# Patient Record
Sex: FEMALE | Race: WHITE | NOT HISPANIC OR LATINO | Employment: STUDENT | RURAL
[De-identification: names, ages, dates, MRNs, and addresses within clinical notes are randomized per-mention and may not be internally consistent; named-entity substitution may affect disease eponyms.]

---
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
## 2025-02-17 ENCOUNTER — TELEMEDICINE (OUTPATIENT)
Dept: PSYCHIATRY | Facility: CLINIC | Age: 20
End: 2025-02-17
Payer: OTHER GOVERNMENT

## 2025-02-17 DIAGNOSIS — Z79.899 ENCOUNTER FOR LONG-TERM (CURRENT) USE OF MEDICATIONS: ICD-10-CM

## 2025-02-17 DIAGNOSIS — F90.2 ATTENTION DEFICIT HYPERACTIVITY DISORDER, COMBINED TYPE: Primary | ICD-10-CM

## 2025-02-17 DIAGNOSIS — F41.1 GENERALIZED ANXIETY DISORDER: ICD-10-CM

## 2025-02-17 RX ORDER — METHYLPHENIDATE HYDROCHLORIDE 30 MG/1
30 CAPSULE, EXTENDED RELEASE ORAL EVERY MORNING
Qty: 30 CAPSULE | Refills: 0 | Status: SHIPPED | OUTPATIENT
Start: 2025-02-17

## 2025-02-17 NOTE — PATIENT INSTRUCTIONS
For concerns or needing assistance call the [facility] at [phone]  Go to [facility] for urine drug screen  Controlled substance agreement sent to mary  Continue with Ritalin LA 30mg every day  Continue with sertraline 75mg daily with food  Medication refills:- Refill requests are addressed M-Th. No refills will be sent in on Fridays, weekends or federal holidays.   Please be aware of your need for refills and call one week before needing medication refilled so it can be filled in a timely manner.

## 2025-02-17 NOTE — PROGRESS NOTES
"Mode of Visit: Video  Location of patient: -HOME-  Location of provider: +HOME+  You have chosen to receive care through a telehealth visit.  The patient has signed the video visit consent form.  The visit included audio and video interaction. No technical issues occurred during this visit.     Patient verbally confirmed consent for today's encounter  02/17/2025         Subjective   Gonzalo Tong is a 19 y.o. female who presents today for follow up For medication management    Chief Complaint: \"ADHD/anxiety\"    History of Present Illness:    History of Present Illness  Patient and her grandmother were present for appointment.    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    Has started therapy services weekly with [facility] support.  Utilizing Ritalin every day now, not as irritable.  Zoloft continues to help with mood and anxiety.  SARITA-7 score today at 0.  PHQ-9 is 7 today.  Patient denies feeling depressed.            PHQ-9 Depression Screening  Little interest or pleasure in doing things? (Patient-Rptd) Not at all   Feeling down, depressed, or hopeless? (Patient-Rptd) Not at all   PHQ-2 Total Score (Patient-Rptd) 0   Trouble falling or staying asleep, or sleeping too much? (Patient-Rptd) Almost all   Feeling tired or having little energy? (Patient-Rptd) Several days   Poor appetite or overeating? (Patient-Rptd) Almost all   Feeling bad about yourself - or that you are a failure or have let yourself or your family down? (Patient-Rptd) Not at all   Trouble concentrating on things, such as reading the newspaper or watching television? (Patient-Rptd) Not at all   Moving or speaking so slowly that other people could have noticed? Or the opposite - being so fidgety or restless that you have been moving around a lot more than usual? (Patient-Rptd) Not at all   Thoughts that you would be better off dead, or of hurting yourself in some way? (Patient-Rptd) Not at all   PHQ-9 Total Score " (Patient-Rptd) 7   If you checked off any problems, how difficult have these problems made it for you to do your work, take care of things at home, or get along with other people? (Patient-Rptd) Not difficult at all           SARITA-7  Feeling nervous, anxious or on edge: (Patient-Rptd) Not at all  Not being able to stop or control worrying: (Patient-Rptd) Not at all  Worrying too much about different things: (Patient-Rptd) Not at all  Trouble Relaxing: (Patient-Rptd) Not at all  Being so restless that it is hard to sit still: (Patient-Rptd) Not at all  Feeling afraid as if something awful might happen: (Patient-Rptd) Not at all  Becoming easily annoyed or irritable: (Patient-Rptd) Not at all  SARITA 7 Total Score: (Patient-Rptd) 0  If you checked any problems, how difficult have these problems made it for you to do your work, take care of things at home, or get along with other people: (Patient-Rptd) Not difficult at all       The following portions of the patient's history were reviewed and updated as appropriate: allergies, current medications, past family history, past medical history, past social history, past surgical history and problem list.    Past Psychiatric History:  Majority of psychiatric history was given by mother.  Patient was treated at [facility] by ADI Juárez.  Apparently there was some issues with insurance and transportation to and from was becoming expensive so they needed a new provider. Patient has been diagnosed with generalized anxiety disorder and ADHD.    Historical medications are sertraline which was started at 25 mg daily and currently titrated to 75 mg daily.  Concerta most recently prescribed in May 2022 at 27 mg daily, previously 18 mg daily.  Denies any history of hospitalizations.  Some self harming behaviors including hitting her head on the wall, would scratch herself, picks, and punch things.  Denies actual cutting.  History of  not taking medication as ordered.  Mother reports patient was taken to somewhere in [place] for testing for dyslexia because mother had reportedly has dyslexia.  Cannot remember where this was.  Has engaged in therapy, family therapy and individual therapy.  Currently engaged in therapy with Lyudmila Burgos in [facility].  Previously was in therapy with a counselor at school through [facility] services.    Had psychological  Testing with Dr. Royer Arreguin on May 16, 2023.  Diagnosis was not autism, only ADHD and anxiety, full scale IQ recorded at 85.  Questionable learning disability in math.  TRIAL OF QELBREE SAMPLES GAVE SUICIDAL THOUGHTS.       Past Medical History:  Past Medical History:   Diagnosis Date    Acne     ADHD (attention deficit hyperactivity disorder)     Anxiety     Chronic pain disorder fibromyalgia    Depression     Fibromyalgia     Hairy nevus     Head injury     PTSD (post-traumatic stress disorder)    Patient was born at 29 weeks gestation by  on an emergent basis.  Spent about a month in the NICU.    Substance Abuse History:   Types:Denies all, including illicit      Social History:  Social History     Socioeconomic History    Marital status: Single    Highest education level: 10th grade   Tobacco Use    Smoking status: Never    Smokeless tobacco: Never   Vaping Use    Vaping status: Never Used   Substance and Sexual Activity    Alcohol use: Never    Drug use: Never    Sexual activity: Defer   Patient reports support system of her mother and maternal grandmother, and 1 friend.  Denies any history of legal problems.  Currently attending [employer], in the 12th grade ( school year.)  Mother is primary retirement parent, patient has visitation with father on some weekends and during the summer break.  Also on regular breaks from school she has opportunity to visit with father.       Family History:  Family History   Problem Relation  Age of Onset    Anxiety disorder Mother     ADD / ADHD Mother     Depression Mother     ADD / ADHD Father     ADD / ADHD Maternal Grandmother    Father reportedly has anger issues and reportedly has been to anger management.  Patient has no siblings.  Parents are currently , father was reportedly abusive towards mother.    Past Surgical History:  Past Surgical History:   Procedure Laterality Date    COLONOSCOPY N/A 4/6/2023    Procedure: COLONOSCOPY;  Surgeon: Tico Carreon MD;  Location: [facility] ENDOSCOPY;  Service: Gastroenterology;  Laterality: N/A;    ENDOSCOPY N/A 4/6/2023    Procedure: ESOPHAGOGASTRODUODENOSCOPY;  Surgeon: Tico Carreon MD;  Location: [facility] ENDOSCOPY;  Service: Gastroenterology;  Laterality: N/A;    SKIN LESION EXCISION         Problem List:  Patient Active Problem List   Diagnosis    Anxiety    Attention deficit hyperactivity disorder, combined type    Generalized abdominal pain    Diarrhea    Nausea    Other constipation    Hemorrhage of anus and rectum       Allergy:   Allergies   Allergen Reactions    Qelbree [Viloxazine Hcl Er] Mental Status Change     Gave suicidal thoughts    Povidone-Iodine Itching        Current Medications:   Current Outpatient Medications   Medication Sig Dispense Refill    gabapentin (NEURONTIN) 100 MG capsule Take 1 capsule by mouth every night at bedtime.      methylphenidate LA (Ritalin LA) 30 MG 24 hr capsule Take 1 capsule by mouth Every Morning 30 capsule 0    sertraline (ZOLOFT) 25 MG tablet Take 1 tablet by mouth Daily. 30 tablet 5    sertraline (ZOLOFT) 50 MG tablet Take 1 tablet by mouth Daily. 30 tablet 5    cetirizine (zyrTEC) 10 MG tablet Take 1 tablet by mouth Daily. (Patient not taking: Reported on 11/14/2024)      Cholecalciferol (Vitamin D3) 25 MCG (1000 UT) capsule Take 1 capsule by mouth Daily.       No current facility-administered medications for this visit.        Physical Exam:   Physical Exam  Constitutional:        Appearance: Normal appearance.      Comments:     HENT:      Head: Normocephalic.   Neurological:      Mental Status: She is alert.   Psychiatric:         Attention and Perception: Attention and perception normal.         Mood and Affect: Mood and affect normal.         Speech: Speech normal.         Behavior: Behavior normal. Behavior is cooperative.         Thought Content: Thought content normal.         Cognition and Memory: Cognition and memory normal.         Judgment: Judgment normal.         Vitals:  There were no vitals taken for this visit.   Due to extenuating circumstances and possible current health risks associated with the patient being present in a clinical setting (with current health restrictions in place in regards to possible COVID 19 transmission/exposure), the patient was seen remotely today via a MyChart Video Visit through [facility] and telephone encounter.  Unable to obtain vital signs due to nature of remote visit.  Height stated at 60 inches.  Weight stated at 140 pounds.    Last 3 Blood Pressure Readings:  BP Readings from Last 3 Encounters:   05/31/23 110/69 (62%, Z = 0.31 /  72%, Z = 0.58)*   04/21/23 125/76 (95%, Z = 1.64 /  92%, Z = 1.41)*   04/06/23 105/77 (42%, Z = -0.20 /  93%, Z = 1.48)*     *BP percentiles are based on the 2017 AAP Clinical Practice Guideline for girls           Mental Status Exam:   Hygiene:   good  Cooperation:  Cooperative  Eye Contact:  Fair  Psychomotor Behavior:  Appropriate  Affect:  Full range  Mood: euthymic  Hopelessness: Denies  Speech:  Normal  Thought Process:  Goal directed and Linear  Thought Content:  Normal  Suicidal:  None  Homicidal:  None  Hallucinations:  None  Delusion:  None  Memory:  Intact  Orientation:  Person, Place, Time and Situation  Reliability:  good  Insight:  Fair  Judgement:  Fair  Impulse Control:  Fair  Physical/Medical Issues:  No        Lab Results:   No recent labs    Assessment & Plan   Diagnoses and all orders for this  "visit:    1. Attention deficit hyperactivity disorder, combined type (Primary)  -     methylphenidate LA (Ritalin LA) 30 MG 24 hr capsule; Take 1 capsule by mouth Every Morning  Dispense: 30 capsule; Refill: 0    2. Generalized anxiety disorder    3. Encounter for long-term (current) use of medications  -     Urine Drug Screen - Urine, Clean Catch; Future              Visit Diagnoses:    ICD-10-CM ICD-9-CM   1. Attention deficit hyperactivity disorder, combined type  F90.2 314.01   2. Generalized anxiety disorder  F41.1 300.02   3. Encounter for long-term (current) use of medications  Z79.899 V58.69             GOALS:  Short Term Goals: Patient will be compliant with medication, and patient will have no significant medication related side effects.  Patient will be engaged in psychotherapy as indicated.  Patient will report subjective improvement of symptoms.  Long term goals: To stabilize mood and treat/improve subjective symptoms, the patient will stay out of the hospital, the patient will be at an optimal level of functioning, and the patient will take all medications as prescribed.  The patient/guardian verbalized understanding and agreement with goals that were mutually set.    RISK ASSESSMENT  Current harm-to-self risk is reported by pt as \"none.\"  Current aqjj-ev-[id number] risk is reported by pt as \"none.\"   No suicidal thoughts, intent, plan is appreciated by this provider on this date of exam.   No homicidal thoughts, intent, plan is appreciated by this provider on this date.    TREATMENT PLAN: Continue supportive psychotherapy efforts and medications as indicated.   Pharmacological and Non-Pharmacological treatment options discussed during today's visit. Patient/Guardian acknowledged and verbally consented with current treatment plan and was educated on the importance of compliance with treatment and follow-up appointments.      MEDICATION ISSUES:  Discussed medication options and treatment plan of prescribed " medication as well as the risks, benefits, any black box warnings, and side effects including potential falls, possible impaired driving, and metabolic adversities among others. Patient is agreeable to call the office with any worsening of symptoms or onset of side effects, or if any concerns or questions arise.  The contact information for the office is made available to the patient. Patient is agreeable to call 911 or go to the nearest ER should they begin having any SI/HI, or if any urgent concerns arise. No medication side effects or related complaints today.    Educated to lock up medications from pets, children, others who may be in the home. Cl verbalized understanding. If the client is a female of child-bearing age, she was dully educated to NOT become pregnant while on the medication (s) and educated to use back up birth control methods if necessary as some medications can interfere with some birth control methods. She was also educated on the risks to the developing child should she become pregnant.    1.  Continue sertraline 75 mg daily with food  2.  Continue Ritalin LA 30 mg capsule    3.  Reminded to call provider 1 week prior to needing a refill on the Ritalin LA.      4.  Urine drug screen ordered.  [facility] to [facility] in [facility] ORDERED DURING VISIT:  New Medications Ordered This Visit   Medications    methylphenidate LA (Ritalin LA) 30 MG 24 hr capsule     Sig: Take 1 capsule by mouth Every Morning     Dispense:  30 capsule     Refill:  0       Follow Up Appointment:   Return in about 12 weeks (around 5/12/2025) for Recheck, Video visit.                This document has been electronically signed by ADI Pickard  February 18, 2025 18:12 EST    Dictated Utilizing Dragon Dictation: Part of this note may be an electronic transcription/translation of spoken language to printed text using the Dragon Dictation System.

## 2025-04-01 DIAGNOSIS — F41.1 GENERALIZED ANXIETY DISORDER: ICD-10-CM

## 2025-04-01 RX ORDER — SERTRALINE HYDROCHLORIDE 25 MG/1
25 TABLET, FILM COATED ORAL DAILY
Qty: 30 TABLET | Refills: 5 | Status: SHIPPED | OUTPATIENT
Start: 2025-04-01

## 2025-05-05 DIAGNOSIS — F90.2 ATTENTION DEFICIT HYPERACTIVITY DISORDER, COMBINED TYPE: ICD-10-CM

## 2025-05-05 RX ORDER — METHYLPHENIDATE HYDROCHLORIDE 30 MG/1
30 CAPSULE, EXTENDED RELEASE ORAL EVERY MORNING
Qty: 30 CAPSULE | Refills: 0 | Status: SHIPPED | OUTPATIENT
Start: 2025-05-05

## 2025-05-12 ENCOUNTER — TELEMEDICINE (OUTPATIENT)
Dept: PSYCHIATRY | Facility: CLINIC | Age: 20
End: 2025-05-12
Payer: OTHER GOVERNMENT

## 2025-05-12 DIAGNOSIS — F90.2 ATTENTION DEFICIT HYPERACTIVITY DISORDER, COMBINED TYPE: Primary | ICD-10-CM

## 2025-05-12 DIAGNOSIS — F41.1 GENERALIZED ANXIETY DISORDER: ICD-10-CM

## 2025-05-12 PROCEDURE — 99214 OFFICE O/P EST MOD 30 MIN: CPT | Performed by: NURSE PRACTITIONER

## 2025-05-12 PROCEDURE — 96127 BRIEF EMOTIONAL/BEHAV ASSMT: CPT | Performed by: NURSE PRACTITIONER

## 2025-05-12 RX ORDER — DOXYCYCLINE 100 MG/1
CAPSULE ORAL
COMMUNITY
Start: 2025-05-07

## 2025-05-12 RX ORDER — ADAPALENE AND BENZOYL PEROXIDE GEL, 0.1%/2.5% 1; 25 MG/G; MG/G
GEL TOPICAL
COMMUNITY
Start: 2025-05-08

## 2025-05-12 NOTE — PATIENT INSTRUCTIONS
For concerns or needing assistance call the [facility] at [phone]  Medication refills:- Refill requests are addressed M-Th. No refills will be sent in on Fridays, weekends or federal holidays.   Please be aware of your need for refills and call one week before needing medication refilled so it can be filled in a timely manner.   1.  Continue sertraline 75 mg daily with food  2.  Continue Ritalin LA 30 mg capsule

## 2025-05-12 NOTE — PROGRESS NOTES
"Mode of Visit: Video  Location of patient: -HOME-  Location of provider: +HOME+  You have chosen to receive care through a telehealth visit.  The patient has signed the video visit consent form.  The visit included audio and video interaction. No technical issues occurred during this visit.     Patient verbally confirmed consent for today's encounter  05/12/2025         Florinda Tong is a 19 y.o. female who presents today for follow up For medication management    Chief Complaint: \"ADHD/anxiety\"    History of Present Illness:    History of Present Illness  Patient only was present for appointment.  She reports she went out with her father over the weekend and got a gift for her mother for Mother's Day.  She reports she starts work tomorrow at [email or web address].  She reports sometimes she takes melatonin to help her sleep.  She has not been on a good schedule for sleep and wake since she was in high school.  Zoloft continues to help with mood and anxiety PHQ-9 today is 6, SARITA-7 score today at 2.  Using the Ritalin every day, most recent fill was May 5.            PHQ-9 Depression Screening  Little interest or pleasure in doing things? (Patient-Rptd) Not at all   Feeling down, depressed, or hopeless? (Patient-Rptd) Not at all   PHQ-2 Total Score (Patient-Rptd) 0   Trouble falling or staying asleep, or sleeping too much? (Patient-Rptd) Almost all   Feeling tired or having little energy? (Patient-Rptd) Not at all   Poor appetite or overeating? (Patient-Rptd) Almost all   Feeling bad about yourself - or that you are a failure or have let yourself or your family down? (Patient-Rptd) Not at all   Trouble concentrating on things, such as reading the newspaper or watching television? (Patient-Rptd) Not at all   Moving or speaking so slowly that other people could have noticed? Or the opposite - being so fidgety or restless that you have been moving around a lot more than usual? (Patient-Rptd) Not at all   Thoughts " that you would be better off dead, or of hurting yourself in some way? (Patient-Rptd) Not at all   PHQ-9 Total Score (Patient-Rptd) 6   If you checked off any problems, how difficult have these problems made it for you to do your work, take care of things at home, or get along with other people? (Patient-Rptd) Somewhat difficult           SARITA-7  Feeling nervous, anxious or on edge: (Patient-Rptd) Several days  Not being able to stop or control worrying: (Patient-Rptd) Several days  Worrying too much about different things: (Patient-Rptd) Not at all  Trouble Relaxing: (Patient-Rptd) Not at all  Being so restless that it is hard to sit still: (Patient-Rptd) Not at all  Feeling afraid as if something awful might happen: (Patient-Rptd) Not at all  Becoming easily annoyed or irritable: (Patient-Rptd) Not at all  SARITA 7 Total Score: (Patient-Rptd) 2  If you checked any problems, how difficult have these problems made it for you to do your work, take care of things at home, or get along with other people: (Patient-Rptd) Not difficult at all       The following portions of the patient's history were reviewed and updated as appropriate: allergies, current medications, past family history, past medical history, past social history, past surgical history and problem list.    Past Psychiatric History:  Majority of psychiatric history was given by mother.  Patient was treated at [facility] by ADI Juárez.  Apparently there was some issues with insurance and transportation to and from was becoming expensive so they needed a new provider. Patient has been diagnosed with generalized anxiety disorder and ADHD.    Historical medications are sertraline which was started at 25 mg daily and currently titrated to 75 mg daily.  Concerta most recently prescribed in May 2022 at 27 mg daily, previously 18 mg daily.  Denies any history of hospitalizations.  Some self harming behaviors  including hitting her head on the wall, would scratch herself, picks, and punch things.  Denies actual cutting.  History of not taking medication as ordered.  Mother reports patient was taken to somewhere in [place] for testing for dyslexia because mother had reportedly has dyslexia.  Cannot remember where this was.  Has engaged in therapy, family therapy and individual therapy.  Currently engaged in therapy with Lyudmila Burgos in [place].  Previously was in therapy with a counselor at school through [facility] services.    Had psychological  Testing with Dr. Royer Arreguin on May 16, 2023.  Diagnosis was not autism, only ADHD and anxiety, full scale IQ recorded at 85.  Questionable learning disability in math.  TRIAL OF QELBREE SAMPLES GAVE SUICIDAL THOUGHTS.       Past Medical History:  Past Medical History:   Diagnosis Date    Acne     ADHD (attention deficit hyperactivity disorder)     dr. Garcia found it in testing    Anxiety     Chronic pain disorder fibromyalgia    found this out 5 years ago    Depression     Fibromyalgia     Hairy nevus     Head injury     PTSD (post-traumatic stress disorder)    Patient was born at 29 weeks gestation by  on an emergent basis.  Spent about a month in the NICU.    Substance Abuse History:   Types:Denies all, including illicit      Social History:  Social History     Socioeconomic History    Marital status: Single    Highest education level: 10th grade   Tobacco Use    Smoking status: Never    Smokeless tobacco: Never   Vaping Use    Vaping status: Never Used   Substance and Sexual Activity    Alcohol use: Never    Drug use: Never    Sexual activity: Defer   Patient reports support system of her mother and maternal grandmother, and 1 friend.  Denies any history of legal problems.  Graduated [place] [employer], in the 12th grade ( school year.)  Mother was primary longterm parent prior to age 18, patient has  visitation with father on some weekends and during the summer break.       Family History:  Family History   Problem Relation Age of Onset    Anxiety disorder Mother     ADD / ADHD Mother     Depression Mother     ADD / ADHD Father     ADD / ADHD Maternal Grandmother    Father reportedly has anger issues and reportedly has been to anger management.  Patient has no siblings.  Parents are currently , father was reportedly abusive towards mother.    Past Surgical History:  Past Surgical History:   Procedure Laterality Date    COLONOSCOPY N/A 04/06/2023    Procedure: COLONOSCOPY;  Surgeon: Tico Carreon MD;  Location: [facility] ENDOSCOPY;  Service: Gastroenterology;  Laterality: N/A;    ENDOSCOPY N/A 04/06/2023    Procedure: ESOPHAGOGASTRODUODENOSCOPY;  Surgeon: Tico Carreon MD;  Location: [facility] ENDOSCOPY;  Service: Gastroenterology;  Laterality: N/A;    SKIN LESION EXCISION      UPPER GASTROINTESTINAL ENDOSCOPY  4/6/2023       Problem List:  Patient Active Problem List   Diagnosis    Anxiety    Attention deficit hyperactivity disorder, combined type    Generalized abdominal pain    Diarrhea    Nausea    Other constipation    Hemorrhage of anus and rectum       Allergy:   Allergies   Allergen Reactions    Qelbree [Viloxazine Hcl Er] Mental Status Change     Gave suicidal thoughts    Povidone-Iodine Itching        Current Medications:   Current Outpatient Medications   Medication Sig Dispense Refill    Adapalene-Benzoyl Peroxide 0.1-2.5 % gel       doxycycline (VIBRAMYCIN) 100 MG capsule       cetirizine (zyrTEC) 10 MG tablet Take 1 tablet by mouth Daily. (Patient not taking: Reported on 4/2/2024)      Cholecalciferol (Vitamin D3) 25 MCG (1000 UT) capsule Take 1 capsule by mouth Daily.      gabapentin (NEURONTIN) 100 MG capsule Take 1 capsule by mouth every night at bedtime.      methylphenidate LA (Ritalin LA) 30 MG 24 hr capsule Take 1 capsule by mouth Every Morning 30 capsule 0    sertraline (ZOLOFT)  25 MG tablet Take 1 tablet by mouth Daily. 30 tablet 5    sertraline (ZOLOFT) 50 MG tablet Take 1 tablet by mouth Daily. 30 tablet 5     No current facility-administered medications for this visit.        Physical Exam:   Physical Exam  Constitutional:       Appearance: Normal appearance.      Comments:     HENT:      Head: Normocephalic.   Neurological:      Mental Status: She is alert.   Psychiatric:         Attention and Perception: Attention and perception normal.         Mood and Affect: Mood and affect normal.         Speech: Speech normal.         Behavior: Behavior normal. Behavior is cooperative.         Thought Content: Thought content normal.         Cognition and Memory: Cognition and memory normal.         Judgment: Judgment normal.         Vitals:  There were no vitals taken for this visit.   Due to extenuating circumstances and possible current health risks associated with the patient being present in a clinical setting (with current health restrictions in place in regards to possible COVID 19 transmission/exposure), the patient was seen remotely today via a MyChart Video Visit through [facility] and telephone encounter.  Unable to obtain vital signs due to nature of remote visit.  Height stated at 60 inches.  Weight stated at 147 pounds.  Pressure reading from April 7, 2025 visit with rheumatology [facility], 121/87  Last 3 Blood Pressure Readings:  BP Readings from Last 3 Encounters:   05/31/23 110/69 (62%, Z = 0.31 /  72%, Z = 0.58)*   04/21/23 125/76 (95%, Z = 1.64 /  92%, Z = 1.41)*   04/06/23 105/77 (42%, Z = -0.20 /  93%, Z = 1.48)*     *BP percentiles are based on the 2017 AAP Clinical Practice Guideline for girls           Mental Status Exam:   Hygiene:   good  Cooperation:  Cooperative  Eye Contact:  Fair  Psychomotor Behavior:  Appropriate  Affect:  Full range  Mood: euthymic  Hopelessness: Denies  Speech:  Normal  Thought Process:  Goal directed and Linear  Thought Content:   "Normal  Suicidal:  None  Homicidal:  None  Hallucinations:  None  Delusion:  None  Memory:  Intact  Orientation:  Person, Place, Time and Situation  Reliability:  good  Insight:  Fair  Judgement:  Fair  Impulse Control:  Fair  Physical/Medical Issues:  No        Lab Results:   UDS results reviewed    Assessment & Plan   Diagnoses and all orders for this visit:    1. Attention deficit hyperactivity disorder, combined type (Primary)    2. Generalized anxiety disorder      Visit Diagnoses:    ICD-10-CM ICD-9-CM   1. Attention deficit hyperactivity disorder, combined type  F90.2 314.01   2. Generalized anxiety disorder  F41.1 300.02       GOALS:  Short Term Goals: Patient will be compliant with medication, and patient will have no significant medication related side effects.  Patient will be engaged in psychotherapy as indicated.  Patient will report subjective improvement of symptoms.  Long term goals: To stabilize mood and treat/improve subjective symptoms, the patient will stay out of the hospital, the patient will be at an optimal level of functioning, and the patient will take all medications as prescribed.  The patient/guardian verbalized understanding and agreement with goals that were mutually set.    RISK ASSESSMENT  Current harm-to-self risk is reported by pt as \"none.\"  Current [id number] risk is reported by pt as \"none.\"   No suicidal thoughts, intent, plan is appreciated by this provider on this date of exam.   No homicidal thoughts, intent, plan is appreciated by this provider on this date.    TREATMENT PLAN: Continue supportive psychotherapy efforts and medications as indicated.   Pharmacological and Non-Pharmacological treatment options discussed during today's visit. Patient/Guardian acknowledged and verbally consented with current treatment plan and was educated on the importance of compliance with treatment and follow-up appointments.      MEDICATION ISSUES:  Discussed medication options and " treatment plan of prescribed medication as well as the risks, benefits, any black box warnings, and side effects including potential falls, possible impaired driving, and metabolic adversities among others. Patient is agreeable to call the office with any worsening of symptoms or onset of side effects, or if any concerns or questions arise.  The contact information for the office is made available to the patient. Patient is agreeable to call 911 or go to the nearest ER should they begin having any SI/HI, or if any urgent concerns arise. No medication side effects or related complaints today.    Educated to lock up medications from pets, children, others who may be in the home. Cl verbalized understanding. If the client is a female of child-bearing age, she was dully educated to NOT become pregnant while on the medication (s) and educated to use back up birth control methods if necessary as some medications can interfere with some birth control methods. She was also educated on the risks to the developing child should she become pregnant.    1.  Continue sertraline 75 mg daily with food  2.  Continue Ritalin LA 30 mg capsule     Medication refills:- Refill requests are addressed M-Th. No refills will be sent in on Fridays, weekends or federal holidays.   Please be aware of your need for refills and call one week before needing medication refilled so it can be filled in a timely manner.          MEDS ORDERED DURING VISIT:  No orders of the defined types were placed in this encounter.      Follow Up Appointment:   Return in about 3 months (around 8/6/2025) for Recheck, Video visit.                This document has been electronically signed by ADI Pickard  May 12, 2025 15:17 EDT    Dictated Utilizing Dragon Dictation: Part of this note may be an electronic transcription/translation of spoken language to printed text using the Dragon Dictation System.

## 2025-06-17 DIAGNOSIS — F90.2 ATTENTION DEFICIT HYPERACTIVITY DISORDER, COMBINED TYPE: ICD-10-CM

## 2025-06-17 RX ORDER — METHYLPHENIDATE HYDROCHLORIDE 30 MG/1
30 CAPSULE, EXTENDED RELEASE ORAL EVERY MORNING
Qty: 30 CAPSULE | Refills: 0 | Status: SHIPPED | OUTPATIENT
Start: 2025-06-17

## 2025-07-29 DIAGNOSIS — F90.2 ATTENTION DEFICIT HYPERACTIVITY DISORDER, COMBINED TYPE: ICD-10-CM

## 2025-07-29 RX ORDER — METHYLPHENIDATE HYDROCHLORIDE 30 MG/1
30 CAPSULE, EXTENDED RELEASE ORAL EVERY MORNING
Qty: 30 CAPSULE | Refills: 0 | Status: SHIPPED | OUTPATIENT
Start: 2025-07-29

## 2025-08-06 ENCOUNTER — TELEMEDICINE (OUTPATIENT)
Dept: PSYCHIATRY | Facility: CLINIC | Age: 20
End: 2025-08-06
Payer: OTHER GOVERNMENT

## 2025-08-06 DIAGNOSIS — F41.1 GENERALIZED ANXIETY DISORDER: ICD-10-CM

## 2025-08-06 DIAGNOSIS — F90.2 ATTENTION DEFICIT HYPERACTIVITY DISORDER, COMBINED TYPE: Primary | ICD-10-CM

## 2025-08-06 DIAGNOSIS — F33.1 MAJOR DEPRESSIVE DISORDER, RECURRENT EPISODE, MODERATE: ICD-10-CM

## 2025-08-06 RX ORDER — CIPROFLOXACIN AND DEXAMETHASONE 3; 1 MG/ML; MG/ML
SUSPENSION/ DROPS AURICULAR (OTIC)
COMMUNITY
Start: 2025-07-22

## 2025-08-06 RX ORDER — SERTRALINE HYDROCHLORIDE 25 MG/1
25 TABLET, FILM COATED ORAL DAILY
Qty: 30 TABLET | Refills: 5 | Status: SHIPPED | OUTPATIENT
Start: 2025-08-06

## 2025-08-06 RX ORDER — FLUTICASONE PROPIONATE 50 MCG
1 SPRAY, SUSPENSION (ML) NASAL DAILY
COMMUNITY
Start: 2025-05-16

## (undated) DEVICE — BITEBLOCK ENDO W/STRAP 60F A/ LF DISP

## (undated) DEVICE — CANN SMPL SOFTECH BIFLO ETCO2 A/M 7FT

## (undated) DEVICE — SINGLE-USE BIOPSY FORCEPS: Brand: RADIAL JAW 4